# Patient Record
Sex: FEMALE | Race: WHITE | NOT HISPANIC OR LATINO | Employment: UNEMPLOYED | URBAN - NONMETROPOLITAN AREA
[De-identification: names, ages, dates, MRNs, and addresses within clinical notes are randomized per-mention and may not be internally consistent; named-entity substitution may affect disease eponyms.]

---

## 2024-10-28 ENCOUNTER — HOSPITAL ENCOUNTER (EMERGENCY)
Facility: HOSPITAL | Age: 29
Discharge: HOME | End: 2024-10-28
Attending: EMERGENCY MEDICINE
Payer: COMMERCIAL

## 2024-10-28 VITALS
SYSTOLIC BLOOD PRESSURE: 120 MMHG | HEART RATE: 82 BPM | TEMPERATURE: 98.8 F | DIASTOLIC BLOOD PRESSURE: 69 MMHG | RESPIRATION RATE: 18 BRPM | WEIGHT: 184.75 LBS | OXYGEN SATURATION: 96 %

## 2024-10-28 DIAGNOSIS — K14.8 TONGUE DISCOLORATION: Primary | ICD-10-CM

## 2024-10-28 DIAGNOSIS — D50.9 IRON (FE) DEFICIENCY ANEMIA: ICD-10-CM

## 2024-10-28 LAB
BASOPHILS # BLD AUTO: 0.03 THOUSANDS/ΜL (ref 0–0.1)
BASOPHILS NFR BLD AUTO: 0 % (ref 0–1)
EOSINOPHIL # BLD AUTO: 0.18 THOUSAND/ΜL (ref 0–0.61)
EOSINOPHIL NFR BLD AUTO: 2 % (ref 0–6)
ERYTHROCYTE [DISTWIDTH] IN BLOOD BY AUTOMATED COUNT: 12.6 % (ref 11.6–15.1)
FERRITIN SERPL-MCNC: 28 NG/ML (ref 11–307)
HCT VFR BLD AUTO: 40.8 % (ref 34.8–46.1)
HGB BLD-MCNC: 13.6 G/DL (ref 11.5–15.4)
IMM GRANULOCYTES # BLD AUTO: 0.02 THOUSAND/UL (ref 0–0.2)
IMM GRANULOCYTES NFR BLD AUTO: 0 % (ref 0–2)
IRON SATN MFR SERPL: 35 % (ref 15–50)
IRON SERPL-MCNC: 116 UG/DL (ref 50–212)
LYMPHOCYTES # BLD AUTO: 1.38 THOUSANDS/ΜL (ref 0.6–4.47)
LYMPHOCYTES NFR BLD AUTO: 18 % (ref 14–44)
MCH RBC QN AUTO: 30.4 PG (ref 26.8–34.3)
MCHC RBC AUTO-ENTMCNC: 33.3 G/DL (ref 31.4–37.4)
MCV RBC AUTO: 91 FL (ref 82–98)
MONOCYTES # BLD AUTO: 0.44 THOUSAND/ΜL (ref 0.17–1.22)
MONOCYTES NFR BLD AUTO: 6 % (ref 4–12)
NEUTROPHILS # BLD AUTO: 5.47 THOUSANDS/ΜL (ref 1.85–7.62)
NEUTS SEG NFR BLD AUTO: 74 % (ref 43–75)
NRBC BLD AUTO-RTO: 0 /100 WBCS
PLATELET # BLD AUTO: 270 THOUSANDS/UL (ref 149–390)
PMV BLD AUTO: 9.4 FL (ref 8.9–12.7)
RBC # BLD AUTO: 4.47 MILLION/UL (ref 3.81–5.12)
TIBC SERPL-MCNC: 327 UG/DL (ref 250–450)
UIBC SERPL-MCNC: 211 UG/DL (ref 155–355)
VIT B12 SERPL-MCNC: 660 PG/ML (ref 180–914)
WBC # BLD AUTO: 7.52 THOUSAND/UL (ref 4.31–10.16)

## 2024-10-28 PROCEDURE — 99283 EMERGENCY DEPT VISIT LOW MDM: CPT

## 2024-10-28 PROCEDURE — 85025 COMPLETE CBC W/AUTO DIFF WBC: CPT

## 2024-10-28 PROCEDURE — 83550 IRON BINDING TEST: CPT

## 2024-10-28 PROCEDURE — 82607 VITAMIN B-12: CPT

## 2024-10-28 PROCEDURE — 82728 ASSAY OF FERRITIN: CPT

## 2024-10-28 PROCEDURE — 83540 ASSAY OF IRON: CPT

## 2024-10-28 PROCEDURE — 36415 COLL VENOUS BLD VENIPUNCTURE: CPT

## 2024-10-28 PROCEDURE — 99284 EMERGENCY DEPT VISIT MOD MDM: CPT | Performed by: EMERGENCY MEDICINE

## 2024-10-28 RX ORDER — METHOCARBAMOL 500 MG/1
500 TABLET, FILM COATED ORAL 4 TIMES DAILY
COMMUNITY

## 2024-10-28 RX ORDER — CHLORHEXIDINE GLUCONATE ORAL RINSE 1.2 MG/ML
15 SOLUTION DENTAL 2 TIMES DAILY
Qty: 120 ML | Refills: 0 | Status: SHIPPED | OUTPATIENT
Start: 2024-10-28

## 2024-10-28 RX ORDER — PANTOPRAZOLE SODIUM 40 MG/1
40 TABLET, DELAYED RELEASE ORAL DAILY
COMMUNITY

## 2024-10-28 RX ORDER — MELOXICAM 15 MG/1
15 TABLET ORAL DAILY
COMMUNITY

## 2024-10-28 RX ORDER — FLUOXETINE 40 MG/1
40 CAPSULE ORAL DAILY
COMMUNITY

## 2024-10-28 RX ORDER — FERROUS SULFATE 325(65) MG
325 TABLET, DELAYED RELEASE (ENTERIC COATED) ORAL
COMMUNITY

## 2024-10-28 NOTE — ED PROVIDER NOTES
Time reflects when diagnosis was documented in both MDM as applicable and the Disposition within this note       Time User Action Codes Description Comment    10/28/2024  9:27 AM Jose David Bryant Add [Q38.3] Tongue abnormality     10/28/2024  9:27 AM Jose David Bryant Remove [Q38.3] Tongue abnormality     10/28/2024  9:27 AM Jose David Bryant Add [K14.8] Tongue discoloration     10/28/2024 10:08 AM Jose David Bryant Add [D50.9] Iron (Fe) deficiency anemia           ED Disposition       ED Disposition   Discharge    Condition   Stable    Date/Time   Mon Oct 28, 2024 10:15 AM    Comment   Danielle Fields discharge to home/self care.                   Assessment & Plan       Medical Decision Making  Danielle Fields is a 30 yo female with hx of iron deficiency anemia presents with white discoloration of the tongue. Patient reported the white discoloration of on the top of her tongue has been there 5-6 months. Patient has checked with ENT, PCP, and various urgent with no abnormal findings besides iron deficiency anemia. She's taking iron supplement daily.     Ordered CBC/iron panel/B12 to check if patient has possible vitamin B12 and iron deficiency.  CBC with normal hemoglobin.    Advice patient to follow up with PCP for iron panel and B12 result.   Trial peridex to see if the white discoloration of the tongue could be due to bacterial overgrowth.     Problems Addressed:  Iron (Fe) deficiency anemia: chronic illness or injury     Details: Continue iron supplement daily, avoid taking the pill with protonix.   Tongue discoloration:     Details: Continue iron supplement and add multivitamins.  Avoid taking protonix at the same time    Amount and/or Complexity of Data Reviewed  Labs: ordered. Decision-making details documented in ED Course.    Risk  Prescription drug management.        ED Course as of 10/28/24 1020   Mon Oct 28, 2024   1006 CBC wnl. Vit B12 and iron panel pending       Medications - No data to display    ED Risk Strat  Scores                           SBIRT 20yo+      Flowsheet Row Most Recent Value   Initial Alcohol Screen: US AUDIT-C     1. How often do you have a drink containing alcohol? 0 Filed at: 10/28/2024 0905   2. How many drinks containing alcohol do you have on a typical day you are drinking?  0 Filed at: 10/28/2024 0905   3a. Male UNDER 65: How often do you have five or more drinks on one occasion? 0 Filed at: 10/28/2024 0905   3b. FEMALE Any Age, or MALE 65+: How often do you have 4 or more drinks on one occassion? 0 Filed at: 10/28/2024 0905   Audit-C Score 0 Filed at: 10/28/2024 0905   YONY: How many times in the past year have you...    Used an illegal drug or used a prescription medication for non-medical reasons? Never Filed at: 10/28/2024 0905                            History of Present Illness       Chief Complaint   Patient presents with    Medical Problem     Pt c/o tongue pain for 5-6 months and discoloration. Has been seen at multiple doctors with no relief.        History reviewed. No pertinent past medical history.   History reviewed. No pertinent surgical history.   History reviewed. No pertinent family history.   Social History     Tobacco Use    Smoking status: Never    Smokeless tobacco: Never   Substance Use Topics    Alcohol use: Never    Drug use: Never      E-Cigarette/Vaping      E-Cigarette/Vaping Substances      I have reviewed and agree with the history as documented.     Danielle Fields is a 30 yo female with hx of iron deficiency anemia presents with white discoloration of the tongue. Patient reported the white discoloration of on the top of her tongue has been there 5-6 months. She went to various doctor, ENT, urgent care, and PCP with no abnormal lab test result. She has no trouble swallowing, eating solids, or drinking fluids. She doesn't notice any new oral ulcers in the past few months. Patient was informed she has low iron level and currently taking iron supplement. She also takes  fluoxetine daily. At baseline she has occasional headache. Denied fever, chills, N/V, sore throat, CP, SOB, abdominal pain, changes with urination. No changes with diet lately, but admitted not eating much vegetables.       History provided by:  Patient and parent   used: No    Medical Problem  Location:  Top of tongue  Severity:  Mild  Duration:  6 months  Timing:  Constant  Progression:  Unchanged  Chronicity:  Chronic  Context:  White discoloration of the top of the tongue with mild pain of the tongue  Ineffective treatments:  Iron supplement  Associated symptoms: no abdominal pain, no chest pain, no cough, no diarrhea, no fever, no headaches, no nausea, no rash, no shortness of breath, no sore throat and no vomiting        Review of Systems   Constitutional:  Negative for chills and fever.   HENT:  Negative for dental problem, mouth sores, sore throat and trouble swallowing.         White discoloration of the tongue and tongue pain   Respiratory:  Negative for cough and shortness of breath.    Cardiovascular:  Negative for chest pain.   Gastrointestinal:  Negative for abdominal pain, diarrhea, nausea and vomiting.   Genitourinary:  Negative for dysuria.   Skin:  Negative for color change, pallor and rash.   Neurological:  Negative for dizziness and headaches.   All other systems reviewed and are negative.          Objective       ED Triage Vitals [10/28/24 0907]   Temperature Pulse Blood Pressure Respirations SpO2 Patient Position - Orthostatic VS   98.8 °F (37.1 °C) 93 140/62 18 96 % Sitting      Temp Source Heart Rate Source BP Location FiO2 (%) Pain Score    Temporal Monitor Right arm -- 5      Vitals      Date and Time Temp Pulse SpO2 Resp BP Pain Score FACES Pain Rating User   10/28/24 1006 -- 82 96 % 18 120/69 5 -- BMD   10/28/24 0907 98.8 °F (37.1 °C) 93 96 % 18 140/62 5 -- BMD            Physical Exam  Vitals and nursing note reviewed.   Constitutional:       General: She is not in  acute distress.     Appearance: Normal appearance. She is well-developed. She is not ill-appearing.   HENT:      Head: Normocephalic and atraumatic.      Right Ear: External ear normal.      Left Ear: External ear normal.      Nose: Nose normal. No congestion.      Mouth/Throat:      Mouth: Mucous membranes are moist.      Pharynx: Oropharynx is clear. No oropharyngeal exudate or posterior oropharyngeal erythema.      Comments: White discoloration on the top of tongue; no ulcers or sores noted.   Eyes:      General:         Right eye: No discharge.         Left eye: No discharge.      Extraocular Movements: Extraocular movements intact.      Conjunctiva/sclera: Conjunctivae normal.      Pupils: Pupils are equal, round, and reactive to light.   Cardiovascular:      Rate and Rhythm: Normal rate and regular rhythm.      Pulses: Normal pulses.      Heart sounds: Normal heart sounds. No murmur heard.  Pulmonary:      Effort: Pulmonary effort is normal. No respiratory distress.      Breath sounds: Normal breath sounds. No wheezing.   Abdominal:      General: Abdomen is flat. Bowel sounds are normal. There is no distension.      Palpations: Abdomen is soft.      Tenderness: There is no abdominal tenderness. There is no guarding.   Musculoskeletal:         General: No swelling. Normal range of motion.      Cervical back: Normal range of motion and neck supple.      Right lower leg: No edema.      Left lower leg: No edema.   Skin:     General: Skin is warm and dry.      Capillary Refill: Capillary refill takes less than 2 seconds.      Coloration: Skin is not pale.      Findings: No bruising, lesion or rash.   Neurological:      Mental Status: She is alert and oriented to person, place, and time.   Psychiatric:         Mood and Affect: Mood normal.         Results Reviewed       Procedure Component Value Units Date/Time    CBC and differential [[id number]] Collected: 10/28/24 1508    Lab Status: Final result Specimen: Blood  from Arm, Right Updated: 10/28/24 0951     WBC 7.52 Thousand/uL      RBC 4.47 Million/uL      Hemoglobin 13.6 g/dL      Hematocrit 40.8 %      MCV 91 fL      MCH 30.4 pg      MCHC 33.3 g/dL      RDW 12.6 %      MPV 9.4 fL      Platelets 270 Thousands/uL      nRBC 0 /100 WBCs      Segmented % 74 %      Immature Grans % 0 %      Lymphocytes % 18 %      Monocytes % 6 %      Eosinophils Relative 2 %      Basophils Relative 0 %      Absolute Neutrophils 5.47 Thousands/µL      Absolute Immature Grans 0.02 Thousand/uL      Absolute Lymphocytes 1.38 Thousands/µL      Absolute Monocytes 0.44 Thousand/µL      Eosinophils Absolute 0.18 Thousand/µL      Basophils Absolute 0.03 Thousands/µL     Vitamin B12 [[id number]] Collected: 10/28/24 0934    Lab Status: In process Specimen: Blood from Arm, Right Updated: 10/28/24 0948    Ferritin [[id number]] Collected: 10/28/24 0934    Lab Status: In process Specimen: Blood from Arm, Right Updated: 10/28/24 0948    TIBC Panel (incl. Iron, TIBC, % Iron Saturation) [[id number]] Collected: 10/28/24 0934    Lab Status: In process Specimen: Blood from Arm, Right Updated: 10/28/24 0948            No orders to display       Procedures    ED Medication and Procedure Management   Prior to Admission Medications   Prescriptions Last Dose Informant Patient Reported? Taking?   Cholecalciferol (VITAMIN D3) 1,000 units tablet 10/28/2024  Yes Yes   Sig: Take 2,000 Units by mouth daily   FLUoxetine (PROzac) 40 MG capsule 10/28/2024  Yes Yes   Sig: Take 40 mg by mouth daily   ferrous sulfate 325 (65 FE) MG EC tablet 10/28/2024  Yes Yes   Sig: Take 325 mg by mouth 3 (three) times a day with meals   meloxicam (MOBIC) 15 mg tablet 10/28/2024  Yes Yes   Sig: Take 15 mg by mouth daily   methocarbamol (ROBAXIN) 500 mg tablet 10/28/2024  Yes Yes   Sig: Take 500 mg by mouth 4 (four) times a day   pantoprazole (PROTONIX) 40 mg tablet 10/28/2024  Yes Yes   Sig: Take 40 mg by mouth daily      Facility-Administered  Medications: None     Patient's Medications   Discharge Prescriptions    CHLORHEXIDINE (PERIDEX) 0.12 % SOLUTION    Apply 15 mL to the mouth or throat 2 (two) times a day       Start Date: 10/28/2024End Date: --       Order Dose: 15 mL       Quantity: 120 mL    Refills: 0     No discharge procedures on file.  ED SEPSIS DOCUMENTATION   Time reflects when diagnosis was documented in both MDM as applicable and the Disposition within this note       Time User Action Codes Description Comment    10/28/2024  9:27 AM Jose David Bryant Add [Q38.3] Tongue abnormality     10/28/2024  9:27 AM Jose David Bryant Remove [Q38.3] Tongue abnormality     10/28/2024  9:27 AM Jose David Bryant Add [K14.8] Tongue discoloration     10/28/2024 10:08 AM Jose David Bryant Add [D50.9] Iron (Fe) deficiency anemia                  Jose David Bryant DO  10/28/24 1021       Jose David Bryant DO  10/28/24 1497

## 2024-10-28 NOTE — ED ATTENDING ATTESTATION
10/28/2024  I, Leighann Vasquez MD, saw and evaluated the patient. I have discussed the patient with the resident/non-physician practitioner and agree with the resident's/non-physician practitioner's findings, Plan of Care, and MDM as documented in the resident's/non-physician practitioner's note, except where noted. All available labs and Radiology studies were reviewed.  I was present for key portions of any procedure(s) performed by the resident/non-physician practitioner and I was immediately available to provide assistance.       At this point I agree with the current assessment done in the Emergency Department.  I have conducted an independent evaluation of this patient a history and physical is as follows:    29-year-old female presenting with tongue discoloration for the past 5 to 6 months.  Seen by ENT as well as dental who did not know what was going on.  They did tried nystatin without improvement.  They also told her she may be iron deficient with started on iron.  She has still been able to eat and drink.  Denies fevers.    Physical exam:  Reviewed vitals, no marked abnormalities.  Patient is awake and alert, no acute distress, head normocephalic, atraumatic, mucous membranes moist, whitish discoloration up to the top of the tongue, no other oral lesions, neck supple, heart regular rate and rhythm, no respiratory distress, abdomen nondistended, no peripheral edema, no focal neurologic deficits.    Assessment/plan:  9-year-old female presenting with tongue discoloration for the past 5 to 6 months.  Patient has whitish discoloration to the top of the tongue, does not appear like thrush, no obvious signs of glossitis.  Will check iron panel as well as B12 and hemoglobin.  Will treat with Peridex twice a day for 2 weeks.  Will have patient follow-up with her primary care doctor.    ED Course         Critical Care Time  Procedures

## 2024-10-31 DIAGNOSIS — Z00.6 ENCOUNTER FOR EXAMINATION FOR NORMAL COMPARISON OR CONTROL IN CLINICAL RESEARCH PROGRAM: ICD-10-CM
